# Patient Record
Sex: MALE | Race: WHITE
[De-identification: names, ages, dates, MRNs, and addresses within clinical notes are randomized per-mention and may not be internally consistent; named-entity substitution may affect disease eponyms.]

---

## 2017-01-23 ENCOUNTER — HOSPITAL ENCOUNTER (OUTPATIENT)
Dept: HOSPITAL 53 - M OPP | Age: 62
End: 2017-01-23
Attending: INTERNAL MEDICINE
Payer: COMMERCIAL

## 2017-01-23 VITALS — WEIGHT: 185 LBS | HEIGHT: 71 IN | BODY MASS INDEX: 25.9 KG/M2

## 2017-01-23 VITALS — SYSTOLIC BLOOD PRESSURE: 127 MMHG | DIASTOLIC BLOOD PRESSURE: 96 MMHG

## 2017-01-23 DIAGNOSIS — K57.30: ICD-10-CM

## 2017-01-23 DIAGNOSIS — Z87.891: ICD-10-CM

## 2017-01-23 DIAGNOSIS — Z12.11: Primary | ICD-10-CM

## 2017-01-23 DIAGNOSIS — K64.0: ICD-10-CM

## 2017-01-23 DIAGNOSIS — Z86.010: ICD-10-CM

## 2017-01-23 DIAGNOSIS — Z79.899: ICD-10-CM

## 2017-01-23 DIAGNOSIS — K21.9: ICD-10-CM

## 2017-01-23 NOTE — ROOR
________________________________________________________________________________

Patient Name: Jade Pimentel            Procedure Date: 1/23/2017 8:31 AM

MRN: [id number]                          Account Number: [id number]

YOB: 1955               Age: 61

Room: [facility]                            Gender: Male

Note Status: Finalized                 

________________________________________________________________________________

 

Procedure:           Colonoscopy to Cecum

Indications:         High risk colon cancer surveillance: Personal history of 

                     colonic polyps

Providers:           Olayinka Boss MD

Referring MD:        Edinson Mejias MD

Requesting Provider: 

Medicines:           Monitored Anesthesia Care

Complications:       No immediate complications.

________________________________________________________________________________

Procedure:           Pre-Anesthesia Assessment:

                     - The heart rate, respiratory rate, oxygen saturations, 

                     blood pressure, adequacy of pulmonary ventilation, and 

                     response to care were monitored throughout the procedure.

                     The Colonoscope was introduced through the anus and 

                     advanced to the cecum, identified by appendiceal orifice 

                     and ileocecal valve. The colonoscopy was performed 

                     without difficulty. The patient tolerated the procedure 

                     well. The quality of the bowel preparation was excellent.

                                                                                

Findings:

     The perianal and digital rectal examinations were normal.

     Non-bleeding internal hemorrhoids were found during retroflexion. The 

     hemorrhoids were small and Grade I (internal hemorrhoids that do not 

     prolapse).

     Multiple small and large-mouthed diverticula were found in the entire 

     colon.

     The exam was otherwise without abnormality on direct and retroflexion 

     views.

                                                                                

Impression:          - Non-bleeding internal hemorrhoids.

                     - Diverticulosis in the entire examined colon.

                     - The examination was otherwise normal on direct and 

                     retroflexion views.

                     - No specimens collected.

                     - The exam was otherwise normal to the cecum.

Recommendation:      - Patient has a contact number available for emergencies. 

                     The signs and symptoms of potential delayed complications 

                     were discussed with the patient. Return to normal 

                     activities tomorrow. Written discharge instructions were 

                     provided to the patient.

                     - High fiber diet.

                     - Discharge patient to home.

                     - Continue present medications.

                     - Repeat colonoscopy in 5 years for surveillance.

                     - Return to referring physician.

                     - The findings and recommendations were discussed with 

                     the patient's family.

                                                                                

 

Olayinka Boss MD

____________________

Olayinka Boss MD

1/23/2017 8:52:45 AM

This report has been signed electronically.

Number of Addenda: 0

 

Note Initiated On: 1/23/2017 8:31 AM

Estimated Blood Loss:

     Estimated blood loss: none.

## 2017-03-20 ENCOUNTER — HOSPITAL ENCOUNTER (OUTPATIENT)
Dept: HOSPITAL 53 - M SFHCCLAY | Age: 62
End: 2017-03-20
Attending: FAMILY MEDICINE
Payer: COMMERCIAL

## 2017-03-20 DIAGNOSIS — K21.9: Primary | ICD-10-CM

## 2017-03-20 DIAGNOSIS — E78.00: ICD-10-CM

## 2017-03-20 DIAGNOSIS — N40.0: ICD-10-CM

## 2017-03-20 LAB
ALBUMIN SERPL BCG-MCNC: 3.6 GM/DL (ref 3.2–5.2)
ALBUMIN/GLOB SERPL: 1.16 {RATIO} (ref 1–1.93)
ALP SERPL-CCNC: 64 U/L (ref 45–117)
ALT SERPL W P-5'-P-CCNC: 35 U/L (ref 12–78)
ANION GAP SERPL CALC-SCNC: 7 MEQ/L (ref 8–16)
AST SERPL-CCNC: 25 U/L (ref 15–37)
BILIRUB SERPL-MCNC: 0.3 MG/DL (ref 0.2–1)
BUN SERPL-MCNC: 17 MG/DL (ref 7–18)
CALCIUM SERPL-MCNC: 8.2 MG/DL (ref 8.8–10.2)
CHLORIDE SERPL-SCNC: 109 MEQ/L (ref 98–107)
CHOLEST SERPL-MCNC: 215 MG/DL (ref ?–200)
CO2 SERPL-SCNC: 28 MEQ/L (ref 21–32)
CREAT SERPL-MCNC: 0.77 MG/DL (ref 0.7–1.3)
GFR SERPL CREATININE-BSD FRML MDRD: > 60 ML/MIN/{1.73_M2} (ref 49–?)
GLUCOSE SERPL-MCNC: 101 MG/DL (ref 80–110)
POTASSIUM SERPL-SCNC: 4.4 MEQ/L (ref 3.5–5.1)
PROT SERPL-MCNC: 6.7 GM/DL (ref 6.4–8.2)
SODIUM SERPL-SCNC: 144 MEQ/L (ref 136–145)
TRIGL SERPL-MCNC: 50 MG/DL (ref ?–150)

## 2017-06-14 ENCOUNTER — HOSPITAL ENCOUNTER (OUTPATIENT)
Dept: HOSPITAL 53 - M SFHCCLAY | Age: 62
End: 2017-06-14
Attending: FAMILY MEDICINE
Payer: COMMERCIAL

## 2017-06-14 DIAGNOSIS — E78.00: Primary | ICD-10-CM

## 2018-05-04 ENCOUNTER — HOSPITAL ENCOUNTER (OUTPATIENT)
Dept: HOSPITAL 53 - M SFHCCLAY | Age: 63
End: 2018-05-04
Attending: FAMILY MEDICINE
Payer: COMMERCIAL

## 2018-05-04 DIAGNOSIS — Z11.59: ICD-10-CM

## 2018-05-04 DIAGNOSIS — E78.00: ICD-10-CM

## 2018-05-04 DIAGNOSIS — Z12.5: Primary | ICD-10-CM

## 2018-05-04 LAB
ALBUMIN/GLOBULIN RATIO: 1.28 (ref 1–1.93)
ALBUMIN: 3.7 GM/DL (ref 3.2–5.2)
ALKALINE PHOSPHATASE: 62 U/L (ref 45–117)
ALT SERPL W P-5'-P-CCNC: 28 U/L (ref 12–78)
ANION GAP: 7 MEQ/L (ref 8–16)
AST SERPL-CCNC: 21 U/L (ref 7–37)
BILIRUBIN,TOTAL: 0.4 MG/DL (ref 0.2–1)
BLOOD UREA NITROGEN: 14 MG/DL (ref 7–18)
CALCIUM LEVEL: 8.6 MG/DL (ref 8.8–10.2)
CARBON DIOXIDE LEVEL: 27 MEQ/L (ref 21–32)
CHLORIDE LEVEL: 112 MEQ/L (ref 98–107)
CHOLEST SERPL-MCNC: 198 MG/DL (ref ?–200)
CHOLESTEROL RISK RATIO: 3.6 (ref ?–5)
CREATININE FOR GFR: 0.78 MG/DL (ref 0.7–1.3)
GFR SERPL CREATININE-BSD FRML MDRD: > 60 ML/MIN/{1.73_M2} (ref 49–?)
GLUCOSE, FASTING: 87 MG/DL (ref 70–100)
HCV AB SER QL: < 0 INDEX (ref ?–0.8)
HDLC SERPL-MCNC: 55 MG/DL (ref 40–?)
LDL CHOLESTEROL: 130.2 MG/DL (ref ?–100)
NONHDLC SERPL-MCNC: 143 MG/DL
POTASSIUM SERUM: 4.6 MEQ/L (ref 3.5–5.1)
PSA SERPL-MCNC: 0.7 NG/ML (ref ?–4)
SODIUM LEVEL: 146 MEQ/L (ref 136–145)
TOTAL PROTEIN: 6.6 GM/DL (ref 6.4–8.2)
TRIGLYCERIDES LEVEL: 64 MG/DL (ref ?–150)

## 2018-05-04 PROCEDURE — 80053 COMPREHEN METABOLIC PANEL: CPT

## 2019-07-23 ENCOUNTER — HOSPITAL ENCOUNTER (OUTPATIENT)
Dept: HOSPITAL 53 - M SFHCCLAY | Age: 64
End: 2019-07-23
Attending: NURSE PRACTITIONER
Payer: COMMERCIAL

## 2019-07-23 DIAGNOSIS — M10.9: ICD-10-CM

## 2019-07-23 DIAGNOSIS — E55.9: ICD-10-CM

## 2019-07-23 DIAGNOSIS — E78.00: ICD-10-CM

## 2019-07-23 DIAGNOSIS — K21.9: Primary | ICD-10-CM

## 2019-07-23 LAB
25(OH)D3 SERPL-MCNC: 46.2 NG/ML (ref 30–100)
ALBUMIN SERPL BCG-MCNC: 3.7 GM/DL (ref 3.2–5.2)
ALT SERPL W P-5'-P-CCNC: 28 U/L (ref 12–78)
BILIRUB SERPL-MCNC: 0.4 MG/DL (ref 0.2–1)
BUN SERPL-MCNC: 20 MG/DL (ref 7–18)
CALCIUM SERPL-MCNC: 8.2 MG/DL (ref 8.8–10.2)
CHLORIDE SERPL-SCNC: 111 MEQ/L (ref 98–107)
CHOLEST SERPL-MCNC: 191 MG/DL (ref ?–200)
CHOLEST/HDLC SERPL: 3.18 {RATIO} (ref ?–5)
CO2 SERPL-SCNC: 27 MEQ/L (ref 21–32)
CREAT SERPL-MCNC: 0.81 MG/DL (ref 0.7–1.3)
GFR SERPL CREATININE-BSD FRML MDRD: > 60 ML/MIN/{1.73_M2} (ref 49–?)
GLUCOSE SERPL-MCNC: 95 MG/DL (ref 70–100)
HCT VFR BLD AUTO: 42.7 % (ref 42–52)
HDLC SERPL-MCNC: 60 MG/DL (ref 40–?)
HGB BLD-MCNC: 14.6 G/DL (ref 13.5–17.5)
LDLC SERPL CALC-MCNC: 119 MG/DL (ref ?–100)
MCH RBC QN AUTO: 32.9 PG (ref 27–33)
MCHC RBC AUTO-ENTMCNC: 34.2 G/DL (ref 32–36.5)
MCV RBC AUTO: 96.2 FL (ref 80–96)
NONHDLC SERPL-MCNC: 131 MG/DL
PLATELET # BLD AUTO: 223 10^3/UL (ref 150–450)
POTASSIUM SERPL-SCNC: 4.2 MEQ/L (ref 3.5–5.1)
PROT SERPL-MCNC: 6.4 GM/DL (ref 6.4–8.2)
RBC # BLD AUTO: 4.44 10^6/UL (ref 4.3–6.1)
SODIUM SERPL-SCNC: 143 MEQ/L (ref 136–145)
TRIGL SERPL-MCNC: 60 MG/DL (ref ?–150)
URATE SERPL-MCNC: 6.8 MG/DL (ref 3.5–7.2)
WBC # BLD AUTO: 8.1 10^3/UL (ref 4–10)

## 2021-09-14 ENCOUNTER — HOSPITAL ENCOUNTER (OUTPATIENT)
Dept: HOSPITAL 53 - M SFHCCLAY | Age: 66
End: 2021-09-14
Attending: FAMILY MEDICINE
Payer: COMMERCIAL

## 2021-09-14 DIAGNOSIS — M10.9: ICD-10-CM

## 2021-09-14 DIAGNOSIS — K21.9: ICD-10-CM

## 2021-09-14 DIAGNOSIS — E78.00: Primary | ICD-10-CM

## 2021-09-14 DIAGNOSIS — Z12.5: ICD-10-CM

## 2021-09-14 LAB
ALBUMIN SERPL BCG-MCNC: 3.4 GM/DL (ref 3.2–5.2)
ALT SERPL W P-5'-P-CCNC: 34 U/L (ref 12–78)
BASOPHILS # BLD AUTO: 0 10^3/UL (ref 0–0.2)
BASOPHILS NFR BLD AUTO: 0.3 % (ref 0–1)
BILIRUB SERPL-MCNC: 0.4 MG/DL (ref 0.2–1)
BUN SERPL-MCNC: 14 MG/DL (ref 7–18)
CALCIUM SERPL-MCNC: 8.8 MG/DL (ref 8.8–10.2)
CHLORIDE SERPL-SCNC: 108 MEQ/L (ref 98–107)
CHOLEST SERPL-MCNC: 199 MG/DL (ref ?–200)
CHOLEST/HDLC SERPL: 2.88 {RATIO} (ref ?–5)
CO2 SERPL-SCNC: 30 MEQ/L (ref 21–32)
CREAT SERPL-MCNC: 0.76 MG/DL (ref 0.7–1.3)
EOSINOPHIL # BLD AUTO: 0.1 10^3/UL (ref 0–0.5)
EOSINOPHIL NFR BLD AUTO: 1.4 % (ref 0–3)
GFR SERPL CREATININE-BSD FRML MDRD: > 60 ML/MIN/{1.73_M2} (ref 49–?)
GLUCOSE SERPL-MCNC: 97 MG/DL (ref 70–100)
HCT VFR BLD AUTO: 43.4 % (ref 42–52)
HDLC SERPL-MCNC: 69 MG/DL (ref 40–?)
HGB BLD-MCNC: 14.5 G/DL (ref 13.5–17.5)
LDLC SERPL CALC-MCNC: 121 MG/DL (ref ?–100)
LYMPHOCYTES # BLD AUTO: 1.7 10^3/UL (ref 1.5–5)
LYMPHOCYTES NFR BLD AUTO: 29.6 % (ref 24–44)
MAGNESIUM SERPL-MCNC: 2.3 MG/DL (ref 1.8–2.4)
MCH RBC QN AUTO: 31.6 PG (ref 27–33)
MCHC RBC AUTO-ENTMCNC: 33.4 G/DL (ref 32–36.5)
MCV RBC AUTO: 94.6 FL (ref 80–96)
MONOCYTES # BLD AUTO: 0.5 10^3/UL (ref 0–0.8)
MONOCYTES NFR BLD AUTO: 7.8 % (ref 2–8)
NEUTROPHILS # BLD AUTO: 3.6 10^3/UL (ref 1.5–8.5)
NEUTROPHILS NFR BLD AUTO: 60.7 % (ref 36–66)
NONHDLC SERPL-MCNC: 130 MG/DL
PLATELET # BLD AUTO: 244 10^3/UL (ref 150–450)
POTASSIUM SERPL-SCNC: 4.7 MEQ/L (ref 3.5–5.1)
PROT SERPL-MCNC: 6.5 GM/DL (ref 6.4–8.2)
RBC # BLD AUTO: 4.59 10^6/UL (ref 4.3–6.1)
SODIUM SERPL-SCNC: 143 MEQ/L (ref 136–145)
TRIGL SERPL-MCNC: 46 MG/DL (ref ?–150)
URATE SERPL-MCNC: 6.1 MG/DL (ref 3.5–7.2)
WBC # BLD AUTO: 5.9 10^3/UL (ref 4–10)

## 2022-01-26 ENCOUNTER — HOSPITAL ENCOUNTER (OUTPATIENT)
Dept: HOSPITAL 53 - M LABSMTC | Age: 67
End: 2022-01-26
Attending: ANESTHESIOLOGY
Payer: MEDICARE

## 2022-01-26 DIAGNOSIS — Z11.52: ICD-10-CM

## 2022-01-26 DIAGNOSIS — Z01.812: Primary | ICD-10-CM

## 2022-01-31 ENCOUNTER — HOSPITAL ENCOUNTER (OUTPATIENT)
Dept: HOSPITAL 53 - M OPP | Age: 67
Discharge: HOME | End: 2022-01-31
Attending: INTERNAL MEDICINE
Payer: MEDICARE

## 2022-01-31 VITALS — SYSTOLIC BLOOD PRESSURE: 121 MMHG | DIASTOLIC BLOOD PRESSURE: 81 MMHG

## 2022-01-31 VITALS — HEIGHT: 71 IN | BODY MASS INDEX: 27.58 KG/M2 | WEIGHT: 197 LBS

## 2022-01-31 DIAGNOSIS — Z86.010: ICD-10-CM

## 2022-01-31 DIAGNOSIS — K57.30: ICD-10-CM

## 2022-01-31 DIAGNOSIS — Z12.11: Primary | ICD-10-CM

## 2022-01-31 DIAGNOSIS — Z79.899: ICD-10-CM

## 2022-01-31 DIAGNOSIS — K64.0: ICD-10-CM

## 2022-01-31 DIAGNOSIS — K22.89: ICD-10-CM

## 2022-01-31 DIAGNOSIS — K44.9: ICD-10-CM

## 2022-01-31 DIAGNOSIS — R12: ICD-10-CM

## 2022-01-31 PROCEDURE — 88305 TISSUE EXAM BY PATHOLOGIST: CPT

## 2022-01-31 PROCEDURE — 43239 EGD BIOPSY SINGLE/MULTIPLE: CPT

## 2022-09-15 ENCOUNTER — HOSPITAL ENCOUNTER (OUTPATIENT)
Dept: HOSPITAL 53 - M SFHCCLAY | Age: 67
End: 2022-09-15
Attending: FAMILY MEDICINE
Payer: MEDICARE

## 2022-09-15 DIAGNOSIS — M10.9: ICD-10-CM

## 2022-09-15 DIAGNOSIS — K21.9: ICD-10-CM

## 2022-09-15 DIAGNOSIS — Z12.5: ICD-10-CM

## 2022-09-15 DIAGNOSIS — E78.00: Primary | ICD-10-CM

## 2022-09-15 LAB
ALBUMIN SERPL BCG-MCNC: 3.4 GM/DL (ref 3.2–5.2)
ALT SERPL W P-5'-P-CCNC: 41 U/L (ref 12–78)
BILIRUB SERPL-MCNC: 0.4 MG/DL (ref 0.2–1)
BUN SERPL-MCNC: 20 MG/DL (ref 7–18)
CALCIUM SERPL-MCNC: 8.7 MG/DL (ref 8.8–10.2)
CHLORIDE SERPL-SCNC: 111 MEQ/L (ref 98–107)
CHOLEST SERPL-MCNC: 198 MG/DL (ref ?–200)
CHOLEST/HDLC SERPL: 2.83 {RATIO} (ref ?–5)
CO2 SERPL-SCNC: 25 MEQ/L (ref 21–32)
CREAT SERPL-MCNC: 0.83 MG/DL (ref 0.7–1.3)
GFR SERPL CREATININE-BSD FRML MDRD: > 60 ML/MIN/{1.73_M2} (ref 49–?)
GLUCOSE SERPL-MCNC: 92 MG/DL (ref 70–100)
HDLC SERPL-MCNC: 70 MG/DL (ref 40–?)
LDLC SERPL CALC-MCNC: 114 MG/DL (ref ?–100)
MAGNESIUM SERPL-MCNC: 1.8 MG/DL (ref 1.8–2.4)
NONHDLC SERPL-MCNC: 128 MG/DL
POTASSIUM SERPL-SCNC: 4.2 MEQ/L (ref 3.5–5.1)
PROT SERPL-MCNC: 6.4 GM/DL (ref 6.4–8.2)
SODIUM SERPL-SCNC: 142 MEQ/L (ref 136–145)
TRIGL SERPL-MCNC: 70 MG/DL (ref ?–150)
URATE SERPL-MCNC: 5.8 MG/DL (ref 3.5–7.2)

## 2023-09-18 ENCOUNTER — HOSPITAL ENCOUNTER (OUTPATIENT)
Dept: HOSPITAL 53 - M SFHCCLAY | Age: 68
End: 2023-09-18
Attending: FAMILY MEDICINE
Payer: MEDICARE

## 2023-09-18 DIAGNOSIS — M10.9: ICD-10-CM

## 2023-09-18 DIAGNOSIS — E78.00: ICD-10-CM

## 2023-09-18 DIAGNOSIS — K21.9: Primary | ICD-10-CM

## 2023-09-18 LAB
ALBUMIN SERPL BCG-MCNC: 3.5 G/DL (ref 3.2–5.2)
ALP SERPL-CCNC: 49 U/L (ref 46–116)
ALT SERPL W P-5'-P-CCNC: 26 U/L (ref 7–40)
AST SERPL-CCNC: 20 U/L (ref ?–34)
BILIRUB SERPL-MCNC: 0.5 MG/DL (ref 0.3–1.2)
BUN SERPL-MCNC: 22 MG/DL (ref 9–23)
CALCIUM SERPL-MCNC: 8.3 MG/DL (ref 8.3–10.6)
CHLORIDE SERPL-SCNC: 106 MMOL/L (ref 98–107)
CHOLEST SERPL-MCNC: 189 MG/DL (ref ?–200)
CHOLEST/HDLC SERPL: 2.83 {RATIO} (ref ?–5)
CO2 SERPL-SCNC: 28 MMOL/L (ref 20–31)
CREAT SERPL-MCNC: 0.79 MG/DL (ref 0.7–1.3)
GFR SERPL CREATININE-BSD FRML MDRD: > 60 ML/MIN/{1.73_M2} (ref 49–?)
GLUCOSE SERPL-MCNC: 94 MG/DL (ref 74–106)
HCT VFR BLD AUTO: 43 % (ref 42–52)
HDLC SERPL-MCNC: 66.6 MG/DL (ref 40–?)
HGB BLD-MCNC: 14.3 G/DL (ref 13.5–17.5)
LDLC SERPL CALC-MCNC: 110.2 MG/DL (ref ?–100)
MAGNESIUM SERPL-MCNC: 1.7 MG/DL (ref 1.8–2.4)
MCH RBC QN AUTO: 31.7 PG (ref 27–33)
MCHC RBC AUTO-ENTMCNC: 33.3 G/DL (ref 32–36.5)
MCV RBC AUTO: 95.3 FL (ref 80–96)
NONHDLC SERPL-MCNC: 122.4 MG/DL
PLATELET # BLD AUTO: 254 10^3/UL (ref 150–450)
POTASSIUM SERPL-SCNC: 4.8 MMOL/L (ref 3.5–5.1)
PROT SERPL-MCNC: 6.5 G/DL (ref 5.7–8.2)
RBC # BLD AUTO: 4.51 10^6/UL (ref 4.3–6.1)
SODIUM SERPL-SCNC: 141 MMOL/L (ref 136–145)
TRIGL SERPL-MCNC: 61 MG/DL (ref ?–150)
URATE SERPL-MCNC: 6.3 MG/DL (ref 3.7–9.2)
WBC # BLD AUTO: 6 10^3/UL (ref 4–10)

## 2025-01-16 ENCOUNTER — HOSPITAL ENCOUNTER (OUTPATIENT)
Dept: HOSPITAL 53 - M CLY | Age: 70
End: 2025-01-16
Attending: FAMILY MEDICINE
Payer: MEDICARE

## 2025-01-16 ENCOUNTER — HOSPITAL ENCOUNTER (OUTPATIENT)
Dept: HOSPITAL 53 - M SFHCCLAY | Age: 70
End: 2025-01-16
Attending: FAMILY MEDICINE
Payer: MEDICARE

## 2025-01-16 DIAGNOSIS — R05.3: Primary | ICD-10-CM

## 2025-01-16 DIAGNOSIS — Z13.1: ICD-10-CM

## 2025-01-16 DIAGNOSIS — E78.00: Primary | ICD-10-CM

## 2025-01-16 DIAGNOSIS — Z12.5: ICD-10-CM

## 2025-01-16 DIAGNOSIS — K21.9: ICD-10-CM

## 2025-01-16 DIAGNOSIS — M10.9: ICD-10-CM

## 2025-01-16 LAB
ALBUMIN SERPL BCG-MCNC: 3.8 G/DL (ref 3.2–5.2)
ALP SERPL-CCNC: 51 U/L (ref 40–129)
ALT SERPL W P-5'-P-CCNC: 23 U/L (ref 7–40)
AST SERPL-CCNC: 23 U/L (ref ?–34)
BILIRUB SERPL-MCNC: 0.5 MG/DL (ref 0.3–1.2)
BUN SERPL-MCNC: 19 MG/DL (ref 9–23)
CALCIUM SERPL-MCNC: 9 MG/DL (ref 8.3–10.6)
CHLORIDE SERPL-SCNC: 104 MMOL/L (ref 98–107)
CHOLEST SERPL-MCNC: 235 MG/DL (ref ?–200)
CHOLEST/HDLC SERPL: 3.74 {RATIO} (ref ?–5)
CO2 SERPL-SCNC: 30 MMOL/L (ref 20–31)
CREAT SERPL-MCNC: 0.75 MG/DL (ref 0.7–1.3)
EST. AVERAGE GLUCOSE BLD GHB EST-MCNC: 103 MG/DL (ref 60–110)
GFR SERPL CREATININE-BSD FRML MDRD: > 60 ML/MIN/{1.73_M2} (ref 49–?)
GLUCOSE SERPL-MCNC: 86 MG/DL (ref 74–106)
HCT VFR BLD AUTO: 44.9 % (ref 42–52)
HDLC SERPL-MCNC: 62.8 MG/DL (ref 40–?)
HGB BLD-MCNC: 15.1 G/DL (ref 13.5–17.5)
LDLC SERPL CALC-MCNC: 151.2 MG/DL (ref ?–100)
MCH RBC QN AUTO: 31.9 PG (ref 27–33)
MCHC RBC AUTO-ENTMCNC: 33.6 G/DL (ref 32–36.5)
MCV RBC AUTO: 94.7 FL (ref 80–96)
NONHDLC SERPL-MCNC: 172.2 MG/DL
PLATELET # BLD AUTO: 219 10^3/UL (ref 150–450)
POTASSIUM SERPL-SCNC: 4.9 MMOL/L (ref 3.5–5.1)
PROT SERPL-MCNC: 7.2 G/DL (ref 5.7–8.2)
PSA SERPL-MCNC: 0.98 NG/ML (ref ?–4)
RBC # BLD AUTO: 4.74 10^6/UL (ref 4.3–6.1)
SODIUM SERPL-SCNC: 142 MMOL/L (ref 136–145)
TRIGL SERPL-MCNC: 105 MG/DL (ref ?–150)
URATE SERPL-MCNC: 6.1 MG/DL (ref 3.7–9.2)
WBC # BLD AUTO: 5.8 10^3/UL (ref 4–10)

## 2025-01-16 PROCEDURE — 80061 LIPID PANEL: CPT

## 2025-01-16 PROCEDURE — 80053 COMPREHEN METABOLIC PANEL: CPT

## 2025-01-16 PROCEDURE — 82977 ASSAY OF GGT: CPT

## 2025-01-16 PROCEDURE — 85027 COMPLETE CBC AUTOMATED: CPT

## 2025-01-16 PROCEDURE — 83036 HEMOGLOBIN GLYCOSYLATED A1C: CPT

## 2025-01-16 PROCEDURE — 84550 ASSAY OF BLOOD/URIC ACID: CPT
